# Patient Record
Sex: FEMALE | Race: WHITE | Employment: FULL TIME | URBAN - METROPOLITAN AREA
[De-identification: names, ages, dates, MRNs, and addresses within clinical notes are randomized per-mention and may not be internally consistent; named-entity substitution may affect disease eponyms.]

---

## 2023-11-30 ENCOUNTER — HOSPITAL ENCOUNTER (OUTPATIENT)
Age: 43
Discharge: HOME | End: 2023-11-30
Payer: COMMERCIAL

## 2023-11-30 VITALS
OXYGEN SATURATION: 100 % | SYSTOLIC BLOOD PRESSURE: 93 MMHG | TEMPERATURE: 100 F | DIASTOLIC BLOOD PRESSURE: 72 MMHG | RESPIRATION RATE: 18 BRPM | HEART RATE: 92 BPM

## 2023-11-30 DIAGNOSIS — U07.1 COVID-19: ICD-10-CM

## 2023-11-30 DIAGNOSIS — R50.9 FEVER: Primary | ICD-10-CM

## 2023-11-30 LAB
POCT INFLUENZA A: NEGATIVE
POCT INFLUENZA B: NEGATIVE
S PYO AG THROAT QL: NEGATIVE
SARS-COV-2 RNA RESP QL NAA+PROBE: DETECTED

## 2023-11-30 NOTE — DISCHARGE INSTRUCTIONS
FOLLOW UP WITH YOUR PRIMARY CARE PROVIDER As needed.     -SELF QUARANTINE AND STAY AT HOME UNTIL YOUR SYMPTOMS RESOLVE   CDC recommends 5 days of Quarantine then 5 days of masking from positive test.    -WASH YOUR HANDS OFTEN, DISINFECT COMMON SURFACES AROUND HOME THAT OTHERS USE  -COVER YOUR COUGH AND WEAR A MASK!    -DRINK PLENTY OF WATER, GATORADE, EAT SMALL MEALS, WELL BALANCED FOODS  -TAKE TYLENOL 650MG-1000MG TABLET BY MOUTH EVERY 6 HOURS AS NEEDED FOR BODYACHES, PAIN/FEVER > 100.4  Self-Care at Home:  Runny Nose:  - Nasal Rinse ([place] Pot)  - Flonase  - Antihistamine (Zyrtec, Claritin)  - Nasal Decongestant (Sudafed)  Cough:  - Mucinex OR Robitussin DM  - Warm tea w/honey  - Humidifier in bedroom at night  Sore Throat:  - Salt water gargle  - Ibuprofen every 6-8 hours as needed for pain    GO TO ER: worsening shortness of breath, chest pain, fever > 102 despite use of tylenol or motrin, vomiting and diarrhea and unable to keep down fluids

## 2025-02-11 ENCOUNTER — HOSPITAL ENCOUNTER (OUTPATIENT)
Age: 45
Discharge: HOME | End: 2025-02-11
Payer: COMMERCIAL

## 2025-02-11 VITALS
DIASTOLIC BLOOD PRESSURE: 39 MMHG | HEART RATE: 69 BPM | RESPIRATION RATE: 18 BRPM | TEMPERATURE: 98 F | SYSTOLIC BLOOD PRESSURE: 116 MMHG | OXYGEN SATURATION: 100 %

## 2025-02-11 DIAGNOSIS — R35.0 URINARY FREQUENCY: Primary | ICD-10-CM

## 2025-02-11 LAB
B-HCG UR QL: NEGATIVE
BILIRUB UR QL STRIP: NEGATIVE
CLARITY UR: CLEAR
COLOR UR: YELLOW
GLUCOSE UR STRIP-MCNC: NEGATIVE MG/DL
HGB UR QL STRIP: NEGATIVE
KETONES UR STRIP-MCNC: NEGATIVE MG/DL
NITRITE UR QL STRIP: NEGATIVE
PH UR STRIP: 6.5 [PH]
PROT UR STRIP-MCNC: NEGATIVE MG/DL
SP GR UR STRIP: 1.01
UROBILINOGEN UR STRIP-ACNC: <2 MG/DL

## 2025-02-11 PROCEDURE — 87086 URINE CULTURE/COLONY COUNT: CPT | Performed by: NURSE PRACTITIONER

## 2025-02-11 PROCEDURE — 99214 OFFICE O/P EST MOD 30 MIN: CPT | Performed by: NURSE PRACTITIONER

## 2025-02-11 PROCEDURE — 81002 URINALYSIS NONAUTO W/O SCOPE: CPT | Performed by: NURSE PRACTITIONER

## 2025-02-11 PROCEDURE — 81025 URINE PREGNANCY TEST: CPT | Performed by: NURSE PRACTITIONER

## 2025-02-11 RX ORDER — CEPHALEXIN 500 MG/1
500 CAPSULE ORAL 2 TIMES DAILY
Qty: 14 CAPSULE | Refills: 0 | Status: SHIPPED | OUTPATIENT
Start: 2025-02-11 | End: 2025-02-18

## 2025-02-11 NOTE — ED PROVIDER NOTES
Patient Seen in: [facility]    History   CC: urinary frequency  HPI: Nikki Flaherty 44 year old female w/ history of appendectomy who presents c/o right sided lower abd pain and urinary frequency x3 days, improving. Denies n/v/d/c, dysuria, hematuria. Denies hx of UTIs. Denies vaginal dx, pelvic pain, dyspareunia.    History reviewed. No pertinent past medical history.    Past Surgical History:   Procedure Laterality Date    Appendectomy      Hip replacement surgery Left        No family history on file.    Social History     Socioeconomic History    Marital status:    Tobacco Use    Smoking status: Never    Smokeless tobacco: Never   Vaping Use    Vaping status: Never Used   Substance and Sexual Activity    Alcohol use: Not Currently    Drug use: Never     Social Drivers of Health      Received from [facility]    [facility]       ROS:  Systems reviewed: All pertinent positives noted in HPI. Unless otherwise noted, additional systems reviewed are negative.   Vital signs reviewed.    Positive for stated complaint: Urinary Symptoms - possible UTI?  Other systems are as noted in HPI.  Constitutional and vital signs reviewed.      All other systems reviewed and negative except as noted above.    PSFH elements reviewed from today and agreed except as otherwise stated in HPI.             Constitutional and vital signs reviewed.        Physical Exam     ED Triage Vitals [02/11/25 1044]   /39   Pulse 69   Resp 18   Temp 98.1 °F (36.7 °C)   Temp src Oral   SpO2 100 %   O2 Device None (Room air)       Current:/39   Pulse 69   Temp 98.1 °F (36.7 °C) (Oral)   Resp 18   LMP 02/04/2025 (Approximate)   SpO2 100%         PE:  General - Appears well, non-toxic and in NAD  Head - Appears symmetrical without deformity/swelling cranium, scalp, or facial bones  GI - Appears round and flat, BS +x4 quadrants, no tenderness/guarding with palpation   - no CVA tenderness.   Skin - no  rashes or petechiae noted, pink warm and dry throughout, mmm, cap refill <2seconds  Neuro - A&O x4, steady gait  MSK - makes purposeful movements of all extremities, radial pulses 2+ bilat.  Psych - Interactive and appropriate      ED Course     Labs Reviewed   [facility] POCT URINALYSIS DIPSTICK - Abnormal; Notable for the following components:       Result Value    Leukocyte esterase urine Trace (*)     All other components within normal limits   POCT PREGNANCY URINE - Normal   URINE CULTURE, ROUTINE       MDM     DDx: Cystitis, pyelonephritis, obstructive uropathy, STI, ovarian cyst    Urine dip with trace leukocyte esterase.  UCG negative.  Dedicated urine culture pending.  We will empirically initiate treatment with Keflex and advised hydration instructions, Tylenol or Motrin as needed for discomfort, follow-up and return/ED precautions reviewed.  There is no tenderness to abdomen on exam, afebrile and patient is without vomiting. Patient is historian and demonstrates understanding of all instruction and agrees with plan of care.      Disposition and Plan     Clinical Impression:  1. Urinary frequency        Disposition:  Discharge    Follow-up:  Ryan Jama  [address] [facility]  [address] [place] [id number] [phone]    Go in 1 week  As needed      Medications Prescribed:  Current Discharge Medication List        START taking these medications    Details   cephALEXin 500 MG Oral Cap Take 1 capsule (500 mg total) by mouth 2 (two) times daily for 7 days.  Qty: 14 capsule, Refills: 0

## 2025-02-11 NOTE — ED INITIAL ASSESSMENT (HPI)
Pt c/o RLQ abd pain, urinary frequency x3 days.  No hematuria.  No dysuria.  No N/V/D.  No fever.

## (undated) NOTE — LETTER
Date & Time: 11/30/2023, 12:22 PM  Patient: Gene Wyman  Encounter Provider(s):    CELESTE Matamoros Aas       To Whom It May Concern:    Gene Wyman was seen and treated in our department on 11/30/2023. She should not return to work until 12/5/2023 and mask for 5 more days .     If you have any questions or concerns, please do not hesitate to call.        _____________________________  Physician/AP[facility] Signature